# Patient Record
Sex: FEMALE
[De-identification: names, ages, dates, MRNs, and addresses within clinical notes are randomized per-mention and may not be internally consistent; named-entity substitution may affect disease eponyms.]

---

## 2023-04-18 ENCOUNTER — DASHBOARD ENCOUNTERS (OUTPATIENT)
Age: 53
End: 2023-04-18

## 2023-04-18 ENCOUNTER — OFFICE VISIT (OUTPATIENT)
Dept: URBAN - METROPOLITAN AREA CLINIC 82 | Facility: CLINIC | Age: 53
End: 2023-04-18
Payer: COMMERCIAL

## 2023-04-18 ENCOUNTER — LAB OUTSIDE AN ENCOUNTER (OUTPATIENT)
Dept: URBAN - METROPOLITAN AREA CLINIC 82 | Facility: CLINIC | Age: 53
End: 2023-04-18

## 2023-04-18 VITALS
WEIGHT: 123 LBS | TEMPERATURE: 97.9 F | HEART RATE: 70 BPM | DIASTOLIC BLOOD PRESSURE: 62 MMHG | BODY MASS INDEX: 20.49 KG/M2 | SYSTOLIC BLOOD PRESSURE: 97 MMHG | HEIGHT: 65 IN

## 2023-04-18 DIAGNOSIS — R10.30 LOWER ABDOMINAL PAIN: ICD-10-CM

## 2023-04-18 DIAGNOSIS — K64.9 HEMORRHOIDS, UNSPECIFIED HEMORRHOID TYPE: ICD-10-CM

## 2023-04-18 DIAGNOSIS — K59.00 CONSTIPATION, UNSPECIFIED CONSTIPATION TYPE: ICD-10-CM

## 2023-04-18 PROBLEM — 54586004: Status: ACTIVE | Noted: 2023-04-18

## 2023-04-18 PROBLEM — 14760008: Status: ACTIVE | Noted: 2023-04-18

## 2023-04-18 PROBLEM — 70153002: Status: ACTIVE | Noted: 2023-04-18

## 2023-04-18 PROCEDURE — 993 APS NON BILLABLE: Performed by: STUDENT IN AN ORGANIZED HEALTH CARE EDUCATION/TRAINING PROGRAM

## 2023-04-18 RX ORDER — POLYETHYLENE GLYCOL-3350 AND ELECTROLYTES WITH FLAVOR PACK 240; 5.84; 2.98; 6.72; 22.72 G/278.26G; G/278.26G; G/278.26G; G/278.26G; G/278.26G
240 ML POWDER, FOR SOLUTION ORAL AS DIRECTED
Qty: 1 | Refills: 0 | OUTPATIENT
Start: 2023-04-18 | End: 2023-04-19

## 2023-04-18 NOTE — HPI-TODAY'S VISIT:
52 y.o female was referred by Dr Jaleel Velazquez for an evaluation of hemorrhoids and lower abd pain. A copy of this note will be sent to the referring provider.  At this visit, patient reports dealing w/ hemorrhoids, constipation x 3 months. Currently having BRBPR on toilet paper only. BM: 3 x per week, dami like. Denies any changes in diet. No diarrhea. Admits of straining which worsen hemorrhoids. Taking ducolax which does help. Patient also reports of constant lower abd discomfort on going for 1 year. Denies any provocative factors, BM helps to relieve pain.   Denies any wt loss, loss of appetite, dyspepsia sx.   CT A/P was ordered w/ PCP but patient did not know.   Patient started taking hemorrhoid creams rx from PCP, which did help w/ pain.  Last colonoscopy 14 years ago for constipation, unremarkable. No fhx of crc.

## 2023-04-18 NOTE — PHYSICAL EXAM GASTROINTESTINAL
Abdomen soft, diffused lower abd discomfort, nondistended , no guarding or rigidity , no masses palpable , unremarkable bowel sounds , no hepatosplenomegaly